# Patient Record
(demographics unavailable — no encounter records)

---

## 2025-04-15 NOTE — IMAGING
[de-identified] : right hip:  ROM: Full and pain free. TTP: none     right knee examination shows- Skin changes / Deformity: none TTP: ttp medial quadriceps tendon with no deficit Strength testin/5 all planes Anterior Drawer Test: negative Lachman Test: negative Valgus Stress Test: negative Varus Stress Test: negative Patella Compression Test: negative Patella Apprehension Test: negative Posterior Drawer Test: negative Dc Test: negative Apley Compression Test: negative Calf is soft , supple and nttp. Lower extremity is nvi. Gait is: nml   right knee 3 view x-ray showed no acute bony pathology.

## 2025-04-15 NOTE — IMAGING
[de-identified] : right hip:  ROM: Full and pain free. TTP: none     right knee examination shows- Skin changes / Deformity: none TTP: ttp medial quadriceps tendon with no deficit Strength testin/5 all planes Anterior Drawer Test: negative Lachman Test: negative Valgus Stress Test: negative Varus Stress Test: negative Patella Compression Test: negative Patella Apprehension Test: negative Posterior Drawer Test: negative Dc Test: negative Apley Compression Test: negative Calf is soft , supple and nttp. Lower extremity is nvi. Gait is: nml   right knee 3 view x-ray showed no acute bony pathology.

## 2025-04-15 NOTE — ASSESSMENT
[FreeTextEntry1] : The patient was advised of the diagnosis. The natural history of the pathology was explained in full to the patient in layman's terms. All questions were answered. The risks and benefits of surgical and non-surgical treatment alternatives were explained in full to the patient.  Pt provided hinged knee brace for comfort Hinged knee brace for support. Celebrex 200 mg bid x 10 days. RTO in 2 weeks for f/u care.   Pt informed this will take 4-6 weeks for healing time

## 2025-04-15 NOTE — HISTORY OF PRESENT ILLNESS
[5] : 5 [0] : 0 [Dull/Aching] : dull/aching [Localized] : localized [Intermittent] : intermittent [Full time] : Work status: full time [de-identified] : 4/14/25: 43 y/o patient bent down in a squatting position to  a screw and felt some pain in his right knee. no injury/trauma. no prior hx.  PMH: nasal polyps Allergies: NKDA  [] : Post Surgical Visit: no [FreeTextEntry1] : r knee

## 2025-04-15 NOTE — HISTORY OF PRESENT ILLNESS
[5] : 5 [0] : 0 [Dull/Aching] : dull/aching [Localized] : localized [Intermittent] : intermittent [Full time] : Work status: full time [de-identified] : 4/14/25: 43 y/o patient bent down in a squatting position to  a screw and felt some pain in his right knee. no injury/trauma. no prior hx.  PMH: nasal polyps Allergies: NKDA  [] : Post Surgical Visit: no [FreeTextEntry1] : r knee